# Patient Record
Sex: MALE | Race: OTHER | NOT HISPANIC OR LATINO | URBAN - METROPOLITAN AREA
[De-identification: names, ages, dates, MRNs, and addresses within clinical notes are randomized per-mention and may not be internally consistent; named-entity substitution may affect disease eponyms.]

---

## 2024-06-23 ENCOUNTER — EMERGENCY (EMERGENCY)
Facility: HOSPITAL | Age: 34
LOS: 1 days | Discharge: HOME | End: 2024-06-23
Admitting: EMERGENCY MEDICINE
Payer: SELF-PAY

## 2024-06-23 VITALS
SYSTOLIC BLOOD PRESSURE: 145 MMHG | DIASTOLIC BLOOD PRESSURE: 80 MMHG | OXYGEN SATURATION: 98 % | RESPIRATION RATE: 16 BRPM | TEMPERATURE: 98 F | HEART RATE: 86 BPM

## 2024-06-23 DIAGNOSIS — R51.9 HEADACHE, UNSPECIFIED: ICD-10-CM

## 2024-06-23 DIAGNOSIS — M54.2 CERVICALGIA: ICD-10-CM

## 2024-06-23 PROCEDURE — 99284 EMERGENCY DEPT VISIT MOD MDM: CPT

## 2024-06-23 PROCEDURE — 70450 CT HEAD/BRAIN W/O DYE: CPT | Mod: 26,MC

## 2024-06-23 RX ORDER — METHOCARBAMOL 500 MG/1
2 TABLET, FILM COATED ORAL
Qty: 30 | Refills: 0
Start: 2024-06-23 | End: 2024-06-27

## 2024-06-23 RX ORDER — KETOROLAC TROMETHAMINE 30 MG/ML
30 SYRINGE (ML) INJECTION ONCE
Refills: 0 | Status: DISCONTINUED | OUTPATIENT
Start: 2024-06-23 | End: 2024-06-23

## 2024-06-23 RX ORDER — METOCLOPRAMIDE HCL 10 MG
10 TABLET ORAL ONCE
Refills: 0 | Status: COMPLETED | OUTPATIENT
Start: 2024-06-23 | End: 2024-06-23

## 2024-06-23 RX ORDER — ACETAMINOPHEN 500 MG
650 TABLET ORAL ONCE
Refills: 0 | Status: COMPLETED | OUTPATIENT
Start: 2024-06-23 | End: 2024-06-23

## 2024-06-23 RX ORDER — METHOCARBAMOL 500 MG/1
1000 TABLET, FILM COATED ORAL ONCE
Refills: 0 | Status: COMPLETED | OUTPATIENT
Start: 2024-06-23 | End: 2024-06-23

## 2024-06-23 RX ADMIN — METHOCARBAMOL 1000 MILLIGRAM(S): 500 TABLET, FILM COATED ORAL at 20:23

## 2024-06-23 RX ADMIN — Medication 650 MILLIGRAM(S): at 20:25

## 2024-06-23 RX ADMIN — Medication 30 MILLIGRAM(S): at 22:18

## 2024-06-23 RX ADMIN — Medication 10 MILLIGRAM(S): at 20:23

## 2024-06-23 NOTE — ED PROVIDER NOTE - OBJECTIVE STATEMENT
33-year-old male with no significant past medical history presents to the ED complaining of persistent left-sided headache that been ongoing over the last 3 days.  Pain is constant, worse with head movement, radiates into his neck and shoulder and does improve after taking Excedrin.  Patient unsure if he might of slept different oriented exercises in the gym that might of strained his neck.  He denies other complaints. Pt denies fever, chills, nausea, vomiting, abdominal pain, diarrhea, dizziness, weakness, chest pain, SOB, back pain, LOC, trauma, urinary symptoms, cough, calf pain/swelling, recent travel, recent surgery.

## 2024-06-23 NOTE — ED PROVIDER NOTE - CLINICAL SUMMARY MEDICAL DECISION MAKING FREE TEXT BOX
Healthy 33-year-old male presents to the ED complaining of persisting left-sided head/neck pain has been ongoing over the last 3 days.  Vitals unremarkable, exam shows mild TTP to left paracervical region, neurological exam normal.  CT head negative for acute pathology.  Likely cervical strain given improvement with NSAIDs and reproducible pain.  Patient instructed to follow-up with neurology for further eval if symptoms are not improved.  Patient given strict return precautions.  Patient comfortable with DC.

## 2024-06-23 NOTE — ED PROVIDER NOTE - PATIENT PORTAL LINK FT
You can access the FollowMyHealth Patient Portal offered by [facility] by registering at the following website: [email or web address]. By joining [facility]’s FollowMyHealth portal, you will also be able to view your health information using other applications (apps) compatible with our system.

## 2024-06-23 NOTE — ED PROVIDER NOTE - PHYSICAL EXAMINATION
VITAL SIGNS: I have reviewed nursing notes and confirm.  CONSTITUTIONAL: Well-developed; well-nourished; in no acute distress.  SKIN: Skin exam is warm and dry, no acute rash.  HEAD: Normocephalic; atraumatic. (+) mild TTP to L lower occipital region, no step-offs.   EYES: PERRL, EOM intact; conjunctiva and sclera clear.  ENT: No nasal discharge; airway clear.   NECK: Supple; No midline TTP. (+) L paracervical TTP.   CARD: Regular rate and rhythm.  RESP: Speaking in full sentences.   EXT: Normal ROM.   NEURO: Alert, oriented. Grossly unremarkable. No focal deficits. CN II-XII intact. No dysmetria. No ataxia. Sensation intact and equal throughout. Strength 5/5 throughout. Gait steady.

## 2024-06-23 NOTE — ED ADULT NURSE NOTE - OBJECTIVE STATEMENT
Patient is a 32 y/o M c/o headache. Patient reports headache for the past 3 days. patient denies trauma. patient c/o mild headache and nausea. patient denies vomitting. patient aaox4 speech clear.

## 2024-06-23 NOTE — ED PROVIDER NOTE - CHIEF COMPLAINT
Of note there is no record of urology visit on 2/15/2018   The patient is a 33y Male complaining of headache.